# Patient Record
Sex: FEMALE | Race: WHITE | Employment: FULL TIME | URBAN - METROPOLITAN AREA
[De-identification: names, ages, dates, MRNs, and addresses within clinical notes are randomized per-mention and may not be internally consistent; named-entity substitution may affect disease eponyms.]

---

## 2018-11-03 ENCOUNTER — OFFICE VISIT (OUTPATIENT)
Dept: FAMILY MEDICINE CLINIC | Facility: CLINIC | Age: 23
End: 2018-11-03
Payer: COMMERCIAL

## 2018-11-03 VITALS
RESPIRATION RATE: 14 BRPM | SYSTOLIC BLOOD PRESSURE: 100 MMHG | DIASTOLIC BLOOD PRESSURE: 70 MMHG | TEMPERATURE: 98 F | HEIGHT: 60 IN | WEIGHT: 85 LBS | BODY MASS INDEX: 16.69 KG/M2 | HEART RATE: 84 BPM

## 2018-11-03 DIAGNOSIS — H66.90 ACUTE OTITIS MEDIA, UNSPECIFIED OTITIS MEDIA TYPE: Primary | ICD-10-CM

## 2018-11-03 PROCEDURE — 99202 OFFICE O/P NEW SF 15 MIN: CPT | Performed by: NURSE PRACTITIONER

## 2018-11-03 RX ORDER — CEFDINIR 250 MG/5ML
POWDER, FOR SUSPENSION ORAL
Qty: 115 ML | Refills: 0 | Status: SHIPPED | OUTPATIENT
Start: 2018-11-03

## 2018-11-03 NOTE — PROGRESS NOTES
CHIEF COMPLAINT:   Patient presents with:  Ear Pain        HPI:   Laurie Mcdonald is a 20year old female who presents with parent with URI symptoms and ear pain for  7 days. Ear pain has been present for 1 day.  Onset was quick, Location is left, Symptom SKIN: no rashes, no suspicious lesions  HEAD: atraumatic, normocephalic, no tenderness on palpation of maxillary sinuses is present.   EYES: conjunctiva clear, EOM intact, normal pupils, PERRLA  EARS: Canals are clear with no discharge or inflammation, bila Signed Prescriptions Disp Refills   • cefdinir 250 MG/5ML Oral Recon Susp 115 mL 0     Sig: Take 5.4 ml PO BID for 10 days       Risks, benefits, and side effects of medication explained and discussed.   Patient instructed to follow up with clinic/PCP if sy · Fluid or blood draining from the ear canal  · Fever of 100.4°F (38°C) or as advised   · Seizure  Date Last Reviewed: 6/1/2016  © 4465-4853 [employer]. [address] [facility] [place], [address] [place] [address]. All rights reserved.  This information is not